# Patient Record
Sex: FEMALE | Race: WHITE | NOT HISPANIC OR LATINO | Employment: FULL TIME | URBAN - METROPOLITAN AREA
[De-identification: names, ages, dates, MRNs, and addresses within clinical notes are randomized per-mention and may not be internally consistent; named-entity substitution may affect disease eponyms.]

---

## 2019-01-15 ENCOUNTER — ANNUAL EXAM (OUTPATIENT)
Dept: OBGYN CLINIC | Facility: CLINIC | Age: 34
End: 2019-01-15
Payer: OTHER GOVERNMENT

## 2019-01-15 VITALS
BODY MASS INDEX: 21.85 KG/M2 | DIASTOLIC BLOOD PRESSURE: 72 MMHG | SYSTOLIC BLOOD PRESSURE: 116 MMHG | HEIGHT: 59 IN | WEIGHT: 108.4 LBS

## 2019-01-15 DIAGNOSIS — Z01.419 WOMEN'S ANNUAL ROUTINE GYNECOLOGICAL EXAMINATION: Primary | ICD-10-CM

## 2019-01-15 PROCEDURE — 87624 HPV HI-RISK TYP POOLED RSLT: CPT | Performed by: OBSTETRICS & GYNECOLOGY

## 2019-01-15 PROCEDURE — 99395 PREV VISIT EST AGE 18-39: CPT | Performed by: OBSTETRICS & GYNECOLOGY

## 2019-01-15 PROCEDURE — G0145 SCR C/V CYTO,THINLAYER,RESCR: HCPCS | Performed by: OBSTETRICS & GYNECOLOGY

## 2019-01-15 NOTE — PATIENT INSTRUCTIONS
The patient was informed of a stable gyn examination  A Pap smear was taken  She was given information about intrauterine device  She will let us know her decision  In the meantime she will use condoms  She return my office in 1 year

## 2019-01-15 NOTE — PROGRESS NOTES
HPI    this is a 15-year-old white female she is a  1 para 1 with 1 delivery approximately 21 months ago  That was a vaginal delivery  Her current method of contraception includes condoms  She would like to discuss other method at this time  She has been on a birth control pill in the past she was unhappy with this bed the  We had a discussion about the IUD  She is now interested in this method a brochure was given  She does have a history of an abnormal Pap smear as a teenager  Subsequent Pap smears have been normal   Doing her pregnancy she was diagnosed with gestational diabetes have became preeclamptic  She also had a postpartum hemorrhage  She did not require transfusion  She denies any major gynecological  GI complaints at this time  She denies any problem with intimacy  The patient denies depression or anxiety  She has a dentist on a regular basis  She is happy with her weight  There are no new major family illnesses report this time  REVIEW OF SYSTEMS   the present time there are no major complaints for this patient  Please see above        PAST MEDICAL HISTORY   significant for gestational diabetes, preeclampsia, postpartum hemorrhage,        SURGICAL HISTORY the patient denies any major surgical intervention at this present time      FAMILY HISTORY  positive for heart disease, diabetes, neuropathy, breast cancer, esophageal cancer      SOCIAL HISTORY  negative for tobacco positive for social alcohol        PHYSICAL EXAM    this is a well-developed well-nourished white female acute distress  Her BMI is 21 9  Her HEENT is was within normal limits  Her neck is supple no masses  Cardiac exam shows a regular rhythm and rate normal S1-S2  Her lungs are clear auscultation bilaterally  Breast exam symmetrical nontender no retraction or dimpling no masses axilla clear bilaterally  Her abdomen is soft nontender no masses  There is no evidence screening and surgical procedure  This positive bowel sounds  Her pelvic exam the external genitalia normal limits the vagina is clean  Cervix is closed  A Pap smear was taken  Uterus is anterior normal size is no cervical motion tenderness  The adnexa clear bilaterally  The bladder uterus are well supported  IMPRESSION    the patient was informed of a stable gyn examination  A Pap smear was performed  She is currently using condoms for contraception  She was given a brochure about the intrauterine device  She will call back with her decision  She should return my office in 1 year other than that

## 2019-01-16 LAB
HPV HR 12 DNA CVX QL NAA+PROBE: NEGATIVE
HPV16 DNA CVX QL NAA+PROBE: NEGATIVE
HPV18 DNA CVX QL NAA+PROBE: NEGATIVE

## 2019-01-21 LAB
LAB AP GYN PRIMARY INTERPRETATION: NORMAL
LAB AP LMP: NORMAL
Lab: NORMAL

## 2019-01-25 ENCOUNTER — TELEPHONE (OUTPATIENT)
Dept: OBGYN CLINIC | Facility: CLINIC | Age: 34
End: 2019-01-25

## 2019-02-27 ENCOUNTER — TELEPHONE (OUTPATIENT)
Dept: OBGYN CLINIC | Facility: CLINIC | Age: 34
End: 2019-02-27

## 2019-02-27 NOTE — TELEPHONE ENCOUNTER
Patient canceled IUD insertion  Was calling to confirm still wanted IUD and to set up appointment for next period  Or to see what plan was

## 2020-03-03 ENCOUNTER — ANNUAL EXAM (OUTPATIENT)
Dept: OBGYN CLINIC | Facility: CLINIC | Age: 35
End: 2020-03-03
Payer: OTHER GOVERNMENT

## 2020-03-03 VITALS
DIASTOLIC BLOOD PRESSURE: 70 MMHG | HEIGHT: 59 IN | BODY MASS INDEX: 20.76 KG/M2 | WEIGHT: 103 LBS | SYSTOLIC BLOOD PRESSURE: 120 MMHG

## 2020-03-03 DIAGNOSIS — Z01.419 WOMEN'S ANNUAL ROUTINE GYNECOLOGICAL EXAMINATION: Primary | ICD-10-CM

## 2020-03-03 PROCEDURE — 99395 PREV VISIT EST AGE 18-39: CPT | Performed by: OBSTETRICS & GYNECOLOGY

## 2020-03-03 NOTE — PATIENT INSTRUCTIONS
The patient was informed of a stable gyn examination  A Pap smear was not performed because of prior history being HPV negative  She is happy with her weight  There is no problem depression or anxiety  She again was strongly consider the IUD for contraception

## 2020-03-03 NOTE — PROGRESS NOTES
Assessment/Plan:    The patient was informed of a stable gyn examination  A Pap smear was not performed because of prior history being HPV negative  We again had a discussion about the IUD for contraception  She is strongly considering this method at this time  She is happy with her weight  Denies any problem depression or anxiety  There are no new major family illnesses to report  She has a dentist on a regular basis  She should return my office in 1 year  Subjective:      Patient ID: Rickie Ramirez is a 37 y o  female  HPI    This is a 76-year-old white female, she is a  1 para 1 with 1 vaginal delivery approximately 3 years ago  Her current method of contraception includes condoms  Her menstrual cycles are somewhat irregular but predictable  She again is considering the IUD  We talked about the pros and cons  She will let me know of her decision  She is happy with her weight  Denies any problem with intimacy  She denies any major  GI complaint  There is no breast issues report  She has a dentist on a regular basis  She denies any problems with depression or anxiety  There are no new major family illnesses report this time  The following portions of the patient's history were reviewed and updated as appropriate: allergies, current medications, past family history, past medical history, past social history, past surgical history and problem list     Review of Systems   All other systems reviewed and are negative  Objective:      /70   Ht 4' 11" (1 499 m)   Wt 46 7 kg (103 lb)   LMP 2020 (Exact Date)   BMI 20 80 kg/m²          Physical Exam   Constitutional: She appears well-developed  HENT:   Head: Normocephalic and atraumatic  Eyes: EOM are normal    Neck: Normal range of motion  Neck supple  No JVD present  No tracheal deviation present  No thyromegaly present  Cardiovascular: Normal rate, regular rhythm and normal heart sounds  Pulmonary/Chest: Effort normal and breath sounds normal  No stridor  No respiratory distress  She has no wheezes  She has no rales  She exhibits no tenderness  Right breast exhibits no inverted nipple, no mass, no nipple discharge, no skin change and no tenderness  Left breast exhibits no inverted nipple, no mass, no nipple discharge, no skin change and no tenderness  No breast swelling, tenderness, discharge or bleeding  Abdominal: Soft  Bowel sounds are normal  She exhibits no distension and no mass  There is no tenderness  There is no rebound and no guarding  No hernia  Hernia confirmed negative in the right inguinal area and confirmed negative in the left inguinal area  Genitourinary: Rectum normal and vagina normal  No labial fusion  There is no rash, tenderness, lesion or injury on the right labia  There is no rash, tenderness, lesion or injury on the left labia  Uterus is not deviated, not enlarged, not fixed and not tender  Cervix exhibits no motion tenderness, no discharge and no friability  Right adnexum displays no mass, no tenderness and no fullness  Left adnexum displays no mass, no tenderness and no fullness  No erythema, tenderness or bleeding in the vagina  No foreign body in the vagina  No signs of injury around the vagina  No vaginal discharge found  Genitourinary Comments: The external genitalia normal limits the vagina is clean the cervix is parous but closed uterus is anteverted normal size is no cervical motion tenderness  The adnexa clear bilaterally  A Pap smear was not performed because of prior history being HPV negative in the year 2019  Lymphadenopathy:     She has no cervical adenopathy  No inguinal adenopathy noted on the right or left side  Neurological: She is alert  Skin: Skin is warm and dry  Psychiatric: She has a normal mood and affect   Her behavior is normal

## 2020-03-06 ENCOUNTER — TELEPHONE (OUTPATIENT)
Dept: OBGYN CLINIC | Facility: CLINIC | Age: 35
End: 2020-03-06

## 2020-03-09 NOTE — TELEPHONE ENCOUNTER
Spoke with patient  Will order IUD for her  Does get her period infrequently  Will try to schedule when can

## 2020-03-20 ENCOUNTER — PROCEDURE VISIT (OUTPATIENT)
Dept: OBGYN CLINIC | Facility: CLINIC | Age: 35
End: 2020-03-20
Payer: OTHER GOVERNMENT

## 2020-03-20 VITALS
DIASTOLIC BLOOD PRESSURE: 80 MMHG | BODY MASS INDEX: 20.88 KG/M2 | HEIGHT: 59 IN | WEIGHT: 103.6 LBS | SYSTOLIC BLOOD PRESSURE: 140 MMHG

## 2020-03-20 DIAGNOSIS — Z30.430 ENCOUNTER FOR INSERTION OF MIRENA IUD: Primary | ICD-10-CM

## 2020-03-20 PROCEDURE — 58300 INSERT INTRAUTERINE DEVICE: CPT | Performed by: OBSTETRICS & GYNECOLOGY

## 2020-03-20 NOTE — PATIENT INSTRUCTIONS
She tolerated the procedure well, no complications    Return to office in 5-6 weeks pending the viral epidemic

## 2020-03-20 NOTE — PROGRESS NOTES
Iud insertions  Date/Time: 3/20/2020 11:34 AM  Performed by: Abhishek Keith MD  Authorized by: Abhishek Keith MD     Consent:     Consent obtained:  Verbal and written    Procedure risks and benefits discussed: yes      Patient questions answered: yes      Patient agrees, verbalizes understanding, and wants to proceed: yes      Educational handouts given: yes      Instructions and paperwork completed: yes    Procedure:     Pelvic exam performed: yes      Cervix cleaned and prepped: yes      Speculum placed in vagina: yes      Tenaculum applied to cervix: yes      Uterus sounded: yes      Uterus sound depth (cm):  7    IUD inserted with no complications: yes      IUD type:  Mirena    Strings trimmed: yes    Post-procedure:     Patient tolerated procedure well: yes      Patient will follow up after next period: yes    Comments: The patient tolerated procedure well  Transabdominal ultrasound performed at the insertion showing no evidence of perforation    Instruction booklet was given turn office in 5-6 weeks pending the viral pandemic for follow-up visit

## 2020-04-30 ENCOUNTER — TELEMEDICINE (OUTPATIENT)
Dept: OBGYN CLINIC | Facility: CLINIC | Age: 35
End: 2020-04-30
Payer: OTHER GOVERNMENT

## 2020-04-30 DIAGNOSIS — Z30.431 IUD CHECK UP: Primary | ICD-10-CM

## 2020-04-30 PROCEDURE — 99212 OFFICE O/P EST SF 10 MIN: CPT | Performed by: NURSE PRACTITIONER

## 2020-07-16 ENCOUNTER — OFFICE VISIT (OUTPATIENT)
Dept: OBGYN CLINIC | Facility: CLINIC | Age: 35
End: 2020-07-16
Payer: OTHER GOVERNMENT

## 2020-07-16 VITALS
BODY MASS INDEX: 21.97 KG/M2 | SYSTOLIC BLOOD PRESSURE: 122 MMHG | HEIGHT: 59 IN | DIASTOLIC BLOOD PRESSURE: 80 MMHG | TEMPERATURE: 98.8 F | WEIGHT: 109 LBS

## 2020-07-16 DIAGNOSIS — Z30.431 IUD CHECK UP: Primary | ICD-10-CM

## 2020-07-16 PROCEDURE — 99213 OFFICE O/P EST LOW 20 MIN: CPT | Performed by: NURSE PRACTITIONER

## 2020-07-16 NOTE — PROGRESS NOTES
Subjective      Alexander Mendes is a 37 y o  female who presents for IUD check  Mirena IUD was inserted in March  She has minimal spotting when she wipes  She is happy with the Mirena  The patient is sexually active  Pertinent past medical history: none  Menstrual History:  OB History        0    Para   0    Term   0       0    AB   0    Living   0       SAB   0    TAB   0    Ectopic   0    Multiple   0    Live Births   0                  No LMP recorded  The following portions of the patient's history were reviewed and updated as appropriate: allergies, current medications, past family history, past medical history, past social history, past surgical history and problem list     Review of Systems  Pertinent items are noted in HPI  Objective      /80   Temp 98 8 °F (37 1 °C)   Ht 4' 11" (1 499 m)   Wt 49 4 kg (109 lb)   BMI 22 02 kg/m²     Physical Exam   Constitutional: She is oriented to person, place, and time  Vital signs are normal  She appears well-developed and well-nourished  Genitourinary: Pelvic exam was performed with patient supine  Cervix exhibits visible IUD strings  Genitourinary Comments: IUD position identified with transabdominal US  HENT:   Head: Normocephalic and atraumatic  Neck: Neck supple  Cardiovascular: Normal rate and regular rhythm  Pulmonary/Chest: Effort normal and breath sounds normal    Neurological: She is alert and oriented to person, place, and time  Skin: Skin is warm  Nursing note and vitals reviewed  Assessment     37 y o , continuing IUD, no contraindications  Plan     F/U for annual visit

## 2021-09-28 ENCOUNTER — ANNUAL EXAM (OUTPATIENT)
Dept: OBGYN CLINIC | Facility: CLINIC | Age: 36
End: 2021-09-28
Payer: OTHER GOVERNMENT

## 2021-09-28 VITALS
DIASTOLIC BLOOD PRESSURE: 72 MMHG | SYSTOLIC BLOOD PRESSURE: 110 MMHG | WEIGHT: 107.4 LBS | BODY MASS INDEX: 21.65 KG/M2 | HEIGHT: 59 IN

## 2021-09-28 DIAGNOSIS — Z01.419 WOMEN'S ANNUAL ROUTINE GYNECOLOGICAL EXAMINATION: Primary | ICD-10-CM

## 2021-09-28 PROCEDURE — 99395 PREV VISIT EST AGE 18-39: CPT | Performed by: OBSTETRICS & GYNECOLOGY

## 2021-09-28 NOTE — PATIENT INSTRUCTIONS
The patient was informed of a stable gyn examination  A Pap smear was not performed  The IUD string was visualized  She was informed the IUD is now good for total 7 years which take her to the year 2027  She should return my office in 1 year

## 2021-09-28 NOTE — PROGRESS NOTES
Assessment/Plan:    Patient was informed of a stable gyn examination  A Pap smear was not performed  The IUD string was visualized  She is happy with her weight  There is no problem with intimacy  We had a discussion about the COVID vaccine  She may consider in the future  She is happy with her weight  She feels safe at home  She should return to my office in 1 year  Subjective:      Patient ID: Farrah Verma is a 43 y o  female  HPI    This is a 20-year-old white female, she is a  1 para 1 1 vaginal delivery approximately 4 years ago  Method of contraception now includes the Mirena IUD  This is placed in the year   The patient was informed today that the IUD is now good for 7 years  She has no immediate plans for childbirth  She will now keep her IUD in for another 6 year this should be removed in the year   Mk Su She has no other major gynecological  GI complaint  There is no problem with intimacy  She is content with her weight  She has a dentist on a regular basis she feels safe at home  She has not received a COVID vaccine as yet  We had a discussion about the pros and cons of this she may reconsider  There are no new major family illnesses report this time  Her father has a history of heart disease and is doing well the present time  Denies any problem with depression or anxiety  No problem with sleeping  The following portions of the patient's history were reviewed and updated as appropriate: allergies, current medications, past family history, past medical history, past social history, past surgical history and problem list     Review of Systems   All other systems reviewed and are negative  Objective:      /72   Ht 4' 11" (1 499 m)   Wt 48 7 kg (107 lb 6 4 oz)   LMP 2021 (Exact Date)   BMI 21 69 kg/m²          Physical Exam  Vitals reviewed  Exam conducted with a chaperone present     Constitutional:       Appearance: Normal appearance  She is normal weight  HENT:      Head: Normocephalic and atraumatic  Eyes:      Extraocular Movements: Extraocular movements intact  Cardiovascular:      Rate and Rhythm: Normal rate and regular rhythm  Pulses: Normal pulses  Heart sounds: Normal heart sounds  Pulmonary:      Effort: Pulmonary effort is normal       Breath sounds: Normal breath sounds  Chest:      Breasts:         Right: Normal  No swelling, bleeding, inverted nipple, mass or nipple discharge  Left: Normal  No swelling, bleeding, inverted nipple, mass or nipple discharge  Abdominal:      General: Abdomen is flat  There is no distension  Palpations: Abdomen is soft  There is no hepatomegaly, splenomegaly or mass  Tenderness: There is no abdominal tenderness  There is no right CVA tenderness, left CVA tenderness, guarding or rebound  Hernia: No hernia is present  There is no hernia in the umbilical area, ventral area, left inguinal area or right inguinal area  Genitourinary:     General: Normal vulva  Pubic Area: No rash or pubic lice  Labia:         Right: No rash, tenderness, lesion or injury  Left: No rash, tenderness, lesion or injury  Urethra: No prolapse, urethral pain, urethral swelling or urethral lesion  Vagina: Normal       Cervix: No cervical motion tenderness, discharge, friability, lesion, erythema, cervical bleeding or eversion  Uterus: Normal        Adnexa: Right adnexa normal and left adnexa normal       Rectum: Normal       Comments: The external genitalia normal limits, the vagina is clean  Cervix parous  The IUD string was seen  Uterus is midposition normal size is no cervical motion tenderness  The adnexa is clear bilaterally  A Pap smear was not performed  There was no evidence of prolapse  Musculoskeletal:         General: Normal range of motion  Cervical back: Normal range of motion and neck supple     Lymphadenopathy: Upper Body:      Right upper body: No supraclavicular or axillary adenopathy  Left upper body: No supraclavicular or axillary adenopathy  Lower Body: No right inguinal adenopathy  No left inguinal adenopathy  Skin:     General: Skin is warm  Neurological:      Mental Status: She is alert  Psychiatric:         Mood and Affect: Mood normal          Behavior: Behavior normal          Thought Content:  Thought content normal

## 2023-04-04 ENCOUNTER — ANNUAL EXAM (OUTPATIENT)
Dept: OBGYN CLINIC | Facility: CLINIC | Age: 38
End: 2023-04-04

## 2023-04-04 VITALS
SYSTOLIC BLOOD PRESSURE: 120 MMHG | DIASTOLIC BLOOD PRESSURE: 80 MMHG | HEIGHT: 59 IN | WEIGHT: 119 LBS | BODY MASS INDEX: 23.99 KG/M2

## 2023-04-04 DIAGNOSIS — Z01.419 ENCOUNTER FOR ANNUAL ROUTINE GYNECOLOGICAL EXAMINATION: Primary | ICD-10-CM

## 2023-04-04 DIAGNOSIS — Z12.31 ENCOUNTER FOR SCREENING MAMMOGRAM FOR BREAST CANCER: ICD-10-CM

## 2023-04-04 DIAGNOSIS — Z86.39 HISTORY OF HYPERLIPIDEMIA: ICD-10-CM

## 2023-04-04 DIAGNOSIS — Z86.32 HISTORY OF DIET CONTROLLED GESTATIONAL DIABETES MELLITUS (GDM): ICD-10-CM

## 2023-04-04 RX ORDER — VITAMIN B COMPLEX
2000 TABLET ORAL
COMMUNITY
Start: 2022-09-14

## 2023-04-04 RX ORDER — ACETAMINOPHEN 500 MG
TABLET ORAL
COMMUNITY
Start: 2022-09-14

## 2023-04-04 NOTE — PROGRESS NOTES
Assessment/Plan:  Pap smear done as well as annual   Encourage self breast examination as well as calcium supplementation  Reviewed breast cancer screening starting at age 36 with baseline mammogram   Will obtain labs including hemoglobin A1c, lipid panel, TSH, family history/history ofGDM  Reviewed Mirena IUD effective until 3/2028  All questions answered  Return to office in 1 year or as needed  No problem-specific Assessment & Plan notes found for this encounter  Diagnoses and all orders for this visit:    Encounter for annual routine gynecological examination  -     Liquid-based pap, screening    Encounter for screening mammogram for breast cancer  -     Mammo screening bilateral w 3d & cad; Future    History of diet controlled gestational diabetes mellitus (GDM)  -     Hemoglobin A1C; Future    History of hyperlipidemia  -     Lipid Panel With Ratios; Future  -     TSH, 3rd generation    Other orders  -     Calcium Carbonate-Vit D-Min (Calcium 1200) 4424-2842 MG-UNIT CHEW  -     cholecalciferol (VITAMIN D3) 25 mcg (1,000 units) tablet; 2,000 Units          Subjective:      Patient ID: Leticia Fowler is a 42 y o  female  HPI     This is a very pleasant 64-year-old female G1, P1 ( x1, age 8) presents for GYN exam   She had the Mirena IUD inserted 3/2020  She has been essentially amenorrheic since then  She does get breast tenderness every 4 weeks  She denies any changes in bowel or bladder function  Patient has been in a monogamous relationship with her  for over 8 years  She does recall having an abnormal Pap smear in her 25s followed by cryosurgery  Pap smears since then have been normal     Her pregnancy was complicated by gestational diabetes  She also has had borderline elevated cholesterol with family history of hyperlipidemia      The following portions of the patient's history were reviewed and updated as appropriate: allergies, current medications, past family history, past "medical history, past social history, past surgical history and problem list     Review of Systems   Constitutional: Negative for fatigue, fever and unexpected weight change  Respiratory: Negative for cough, chest tightness, shortness of breath and wheezing  Cardiovascular: Negative  Negative for chest pain and palpitations  Gastrointestinal: Negative  Negative for abdominal distention, abdominal pain, blood in stool, constipation, diarrhea, nausea and vomiting  Genitourinary: Negative  Negative for difficulty urinating, dyspareunia, dysuria, flank pain, frequency, genital sores, hematuria, pelvic pain, urgency, vaginal bleeding, vaginal discharge and vaginal pain  Skin: Negative for rash  Objective:      /80   Ht 4' 11\" (1 499 m)   Wt 54 kg (119 lb)   BMI 24 04 kg/m²          Physical Exam  Constitutional:       Appearance: Normal appearance  She is well-developed  Cardiovascular:      Rate and Rhythm: Normal rate and regular rhythm  Pulmonary:      Effort: Pulmonary effort is normal       Breath sounds: Normal breath sounds  Chest:   Breasts:     Right: No inverted nipple, mass, nipple discharge, skin change or tenderness  Left: No inverted nipple, mass, nipple discharge, skin change or tenderness  Abdominal:      General: Bowel sounds are normal  There is no distension  Palpations: Abdomen is soft  Tenderness: There is no abdominal tenderness  There is no guarding or rebound  Genitourinary:     Labia:         Right: No rash, tenderness or lesion  Left: No rash, tenderness or lesion  Vagina: Normal  No signs of injury  No vaginal discharge or tenderness  Cervix: No cervical motion tenderness, discharge, friability, lesion, erythema or cervical bleeding  Uterus: Not enlarged and not tender  Adnexa:         Right: No mass, tenderness or fullness  Left: No mass, tenderness or fullness       Neurological:      Mental Status: " She is alert and oriented to person, place, and time  Psychiatric:         Behavior: Behavior normal        External genitalia is within normal limits  The vagina is well estrogenized  Cervix is parous  IUD string is visualized

## 2023-08-29 ENCOUNTER — HOSPITAL ENCOUNTER (OUTPATIENT)
Dept: RADIOLOGY | Facility: IMAGING CENTER | Age: 38
Discharge: HOME | End: 2023-08-29
Payer: COMMERCIAL

## 2023-08-29 VITALS — WEIGHT: 115.08 LBS | HEIGHT: 59 IN | BODY MASS INDEX: 23.2 KG/M2

## 2023-08-29 DIAGNOSIS — Z12.31 ENCOUNTER FOR SCREENING MAMMOGRAM FOR BREAST CANCER: ICD-10-CM

## 2023-08-29 PROCEDURE — 77063 BREAST TOMOSYNTHESIS BI: CPT

## 2023-08-29 PROCEDURE — 77067 SCR MAMMO BI INCL CAD: CPT

## 2024-01-16 DIAGNOSIS — Z00.6 ENCOUNTER FOR EXAMINATION FOR NORMAL COMPARISON OR CONTROL IN CLINICAL RESEARCH PROGRAM: ICD-10-CM

## 2024-02-15 ENCOUNTER — APPOINTMENT (OUTPATIENT)
Dept: LAB | Facility: CLINIC | Age: 39
End: 2024-02-15

## 2024-02-15 DIAGNOSIS — Z00.6 ENCOUNTER FOR EXAMINATION FOR NORMAL COMPARISON OR CONTROL IN CLINICAL RESEARCH PROGRAM: ICD-10-CM

## 2024-02-15 PROCEDURE — 36415 COLL VENOUS BLD VENIPUNCTURE: CPT

## 2024-04-18 LAB
APOB+LDLR+PCSK9 GENE MUT ANL BLD/T: NOT DETECTED
BRCA1+BRCA2 DEL+DUP + FULL MUT ANL BLD/T: NOT DETECTED
MLH1+MSH2+MSH6+PMS2 GN DEL+DUP+FUL M: NOT DETECTED

## 2024-04-23 ENCOUNTER — ANNUAL EXAM (OUTPATIENT)
Dept: OBGYN CLINIC | Facility: CLINIC | Age: 39
End: 2024-04-23
Payer: COMMERCIAL

## 2024-04-23 VITALS — WEIGHT: 119 LBS | DIASTOLIC BLOOD PRESSURE: 68 MMHG | BODY MASS INDEX: 24.04 KG/M2 | SYSTOLIC BLOOD PRESSURE: 116 MMHG

## 2024-04-23 DIAGNOSIS — Z12.31 ENCOUNTER FOR SCREENING MAMMOGRAM FOR BREAST CANCER: Primary | ICD-10-CM

## 2024-04-23 DIAGNOSIS — Z01.419 WOMEN'S ANNUAL ROUTINE GYNECOLOGICAL EXAMINATION: ICD-10-CM

## 2024-04-23 PROCEDURE — S0612 ANNUAL GYNECOLOGICAL EXAMINA: HCPCS | Performed by: OBSTETRICS & GYNECOLOGY

## 2024-04-23 NOTE — PROGRESS NOTES
Assessment/Plan:    Patient was informed of a stable GYN examination.  I Pap was not performed.  The IUD string was seen.  There is no problem with intimacy.  She is content with her weight.  She feels safe at home.  She sees a dentist on a regular basis.  Denies upon depression or anxiety.  There is no  or GI complaint.  She will continue getting yearly mammograms.  She is to return to our office in 1 year unless new issues occur.         Subjective:      Patient ID: Sunshine Fernandez is a 38 y.o. female.    HPI    This is a 38-year-old white female, she is a  1 para 1 with 1 vaginal delivery approximately 7 years ago.  Her current method of contraception includes a Mirena IUD.  This was placed in year .  She is now aware this is good to year .  It is working well.  She denies any major gynecological  GI complaint.  She feels safe at home.  She sees a dentist on a regular basis.  She is content with her weight.  She denies any parotid pressure or anxiety.  She does not need a Pap smear today.  She will continue getting her yearly mammograms.  She is aware colorectal screening beginning at age 40.  There are no new major family illness support.  She did lose her father last year with a history of of heart disease and Parkinson's he was 76 years of age.  Her mom is still alive.        The following portions of the patient's history were reviewed and updated as appropriate: allergies, current medications, past family history, past medical history, past social history, past surgical history, and problem list.    Review of Systems   All other systems reviewed and are negative.        Objective:      /68   Wt 54 kg (119 lb)   BMI 24.04 kg/m²          Physical Exam  Vitals reviewed. Exam conducted with a chaperone present.   Constitutional:       Appearance: Normal appearance. She is normal weight.   HENT:      Head: Normocephalic and atraumatic.      Nose: Nose normal.      Mouth/Throat:       Mouth: Mucous membranes are moist.   Eyes:      Extraocular Movements: Extraocular movements intact.      Pupils: Pupils are equal, round, and reactive to light.   Pulmonary:      Effort: Pulmonary effort is normal.      Breath sounds: Normal breath sounds.   Chest:   Breasts:     Breasts are symmetrical.      Right: Normal.      Left: Normal.   Abdominal:      General: Abdomen is flat. Bowel sounds are normal. There is no distension.      Palpations: Abdomen is soft. There is no hepatomegaly, splenomegaly or mass.      Tenderness: There is no abdominal tenderness.      Hernia: No hernia is present. There is no hernia in the left inguinal area or right inguinal area.   Genitourinary:     General: Normal vulva.      Pubic Area: No rash or pubic lice.       Labia:         Right: No rash, tenderness, lesion or injury.         Left: No rash, tenderness, lesion or injury.       Urethra: No prolapse, urethral pain, urethral swelling or urethral lesion.      Rectum: Normal.      Comments: External genitalia normal limit the vagina is clean the cervix is parous with the IUD string was seen.  Pap smear was not performed.  Uterus is anterior normal size.  There is no cervical motion tenderness.  Adnexa clear bilaterally.  There is no evidence of prolapse.  Urethral bladder normal working relationship.  Musculoskeletal:         General: Normal range of motion.      Cervical back: Normal range of motion and neck supple.   Lymphadenopathy:      Upper Body:      Right upper body: No supraclavicular adenopathy.      Left upper body: No supraclavicular adenopathy.      Lower Body: No right inguinal adenopathy. No left inguinal adenopathy.   Skin:     General: Skin is warm and dry.   Neurological:      General: No focal deficit present.      Mental Status: She is alert and oriented to person, place, and time.   Psychiatric:         Mood and Affect: Mood normal.         Behavior: Behavior normal.

## 2024-04-23 NOTE — PATIENT INSTRUCTIONS
Patient was informed of a stable GYN examination.  A Pap smear was not performed.  IUD string was seen.  She is aware the IUD is good to the year 2028.  She is content with the weight.  She will continue getting yearly mammograms.  She will return to office in 1 year unless new issues occur.

## 2024-09-06 ENCOUNTER — HOSPITAL ENCOUNTER (OUTPATIENT)
Dept: RADIOLOGY | Facility: IMAGING CENTER | Age: 39
End: 2024-09-06
Payer: COMMERCIAL

## 2024-09-06 VITALS — BODY MASS INDEX: 23.99 KG/M2 | HEIGHT: 59 IN | WEIGHT: 119 LBS

## 2024-09-06 DIAGNOSIS — Z12.31 ENCOUNTER FOR SCREENING MAMMOGRAM FOR BREAST CANCER: ICD-10-CM

## 2024-09-06 PROCEDURE — 77063 BREAST TOMOSYNTHESIS BI: CPT

## 2024-09-06 PROCEDURE — 77067 SCR MAMMO BI INCL CAD: CPT

## 2025-06-10 ENCOUNTER — ANNUAL EXAM (OUTPATIENT)
Dept: OBGYN CLINIC | Facility: CLINIC | Age: 40
End: 2025-06-10
Payer: COMMERCIAL

## 2025-06-10 VITALS — SYSTOLIC BLOOD PRESSURE: 116 MMHG | BODY MASS INDEX: 24.04 KG/M2 | WEIGHT: 119 LBS | DIASTOLIC BLOOD PRESSURE: 82 MMHG

## 2025-06-10 DIAGNOSIS — Z01.419 WOMEN'S ANNUAL ROUTINE GYNECOLOGICAL EXAMINATION: ICD-10-CM

## 2025-06-10 DIAGNOSIS — Z12.31 ENCOUNTER FOR SCREENING MAMMOGRAM FOR BREAST CANCER: Primary | ICD-10-CM

## 2025-06-10 PROCEDURE — S0612 ANNUAL GYNECOLOGICAL EXAMINA: HCPCS | Performed by: OBSTETRICS & GYNECOLOGY

## 2025-06-10 NOTE — PATIENT INSTRUCTIONS
The patient was informed of a stable GYN examination.  There is no signs of menopause.  IUD strings was seen.  IUD should be replaced in year 2028.  She is content with her weight.  She feels safe at home.  She sees a dentist on a regular basis is no problem depression or anxiety.  There are no major family illnesses to report.  She should return to our office in 1 year.

## 2025-06-10 NOTE — PROGRESS NOTES
Name: Sunshine Fernandez      : 1985      MRN: [id number]  Encounter Provider: Jake Uriarte MD  Encounter Date: 6/10/2025   Encounter department: [facility]  :  Assessment & Plan  Women's annual routine gynecological examination  The patient was informed of a stable GYN examination a Pap smear was not performed pelvic exam was completely benign.  Breast exam benign.  The IUD string was seen.  She should return her office in 1 year unless new issues or problems occur.  She will make arrangements to continue getting yearly mammogram       Encounter for screening mammogram for breast cancer    Orders:    Mammo screening bilateral w 3d and cad; Future        History of Present Illness   HPI  Sunshine Fernandez is a 39 y.o. female who presents for annual GYN examination.  She is a  1 para 1 with 1 vaginal delivery approximately 8 years ago.  Her current method of contraception includes the Mirena IUD.  This should replace in a year .  The patient at this time would like to have another IUD inserted.  There is no problem with intimacy.  There is no problem with bladder control.  Her libido is good.  She will continue getting yearly mammograms.  Family history positive for breast cancer in her mother.  She feels safe at home.  She sees a dentist on a regular basis.  She is a dental hygienist herself.  She is content with her weight.  There is no problem depression or anxiety.  Her PHQ score today is 0.  She does not need a Pap smear today.      Review of Systems   All other systems reviewed and are negative.         Objective   /82   Wt 54 kg (119 lb)   BMI 24.04 kg/m²      Physical Exam  Vitals reviewed. Exam conducted with a chaperone present.   Constitutional:       Appearance: Normal appearance. She is normal weight.   HENT:      Head: Normocephalic and atraumatic.      Nose: Nose normal.      Mouth/Throat:      Mouth: Mucous membranes are moist.     Eyes:      Extraocular  Movements: Extraocular movements intact.      Pupils: Pupils are equal, round, and reactive to light.       Cardiovascular:      Rate and Rhythm: Normal rate and regular rhythm.      Pulses: Normal pulses.      Heart sounds: Normal heart sounds.   Pulmonary:      Effort: Pulmonary effort is normal.   Chest:   Breasts:     Breasts are symmetrical.      Right: Normal.      Left: Normal.   Abdominal:      General: Abdomen is flat. Bowel sounds are normal.      Palpations: Abdomen is soft. There is no hepatomegaly or splenomegaly.      Tenderness: There is no abdominal tenderness.      Hernia: No hernia is present. There is no hernia in the left inguinal area or right inguinal area.   Genitourinary:     General: Normal vulva.      Pubic Area: No rash or pubic lice.       Labia:         Right: No rash, tenderness, lesion or injury.         Left: No rash, tenderness, lesion or injury.       Urethra: No prolapse, urethral pain, urethral swelling or urethral lesion.      Vagina: Normal. No signs of injury and foreign body. No vaginal discharge, erythema, tenderness, bleeding, lesions or prolapsed vaginal walls.      Cervix: Normal.      Uterus: Normal.       Adnexa: Right adnexa normal and left adnexa normal.        Right: No mass or tenderness.          Left: No mass or tenderness.        Rectum: Normal.      Comments: The external genitalia normal limits the vagina is clean the cervix is closed the uterus is anterior normal size the IUD string was seen.  Pap smear was not performed.  The adnexa clear bilaterally.  There is no prolapse.    Musculoskeletal:         General: Normal range of motion.      Cervical back: Normal range of motion and neck supple.     Skin:     General: Skin is warm and dry.     Neurological:      General: No focal deficit present.      Mental Status: She is alert and oriented to person, place, and time.     Psychiatric:         Mood and Affect: Mood normal.         Behavior: Behavior normal.